# Patient Record
Sex: FEMALE | Race: WHITE | NOT HISPANIC OR LATINO | Employment: FULL TIME | URBAN - METROPOLITAN AREA
[De-identification: names, ages, dates, MRNs, and addresses within clinical notes are randomized per-mention and may not be internally consistent; named-entity substitution may affect disease eponyms.]

---

## 2017-08-13 ENCOUNTER — APPOINTMENT (OUTPATIENT)
Dept: ULTRASOUND IMAGING | Facility: HOSPITAL | Age: 27
End: 2017-08-13

## 2017-08-13 ENCOUNTER — HOSPITAL ENCOUNTER (EMERGENCY)
Facility: HOSPITAL | Age: 27
Discharge: HOME | End: 2017-08-13
Attending: EMERGENCY MEDICINE | Admitting: EMERGENCY MEDICINE

## 2017-08-13 VITALS
BODY MASS INDEX: 26.13 KG/M2 | TEMPERATURE: 99.4 F | HEART RATE: 73 BPM | OXYGEN SATURATION: 100 % | HEIGHT: 62 IN | RESPIRATION RATE: 16 BRPM | WEIGHT: 142 LBS | DIASTOLIC BLOOD PRESSURE: 76 MMHG | SYSTOLIC BLOOD PRESSURE: 130 MMHG

## 2017-08-13 DIAGNOSIS — O20.0 THREATENED MISCARRIAGE IN EARLY PREGNANCY: Primary | ICD-10-CM

## 2017-08-13 LAB
ABO GROUP BLD: NORMAL
ANION GAP SERPL CALCULATED.3IONS-SCNC: 14.4 MMOL/L
BASOPHILS # BLD AUTO: 0.02 10*3/MM3 (ref 0–0.2)
BASOPHILS NFR BLD AUTO: 0.2 % (ref 0–1.5)
BUN BLD-MCNC: 9 MG/DL (ref 6–20)
BUN/CREAT SERPL: 16.7 (ref 7–25)
CALCIUM SPEC-SCNC: 9.4 MG/DL (ref 8.6–10.5)
CHLORIDE SERPL-SCNC: 99 MMOL/L (ref 98–107)
CO2 SERPL-SCNC: 23.6 MMOL/L (ref 22–29)
CREAT BLD-MCNC: 0.54 MG/DL (ref 0.57–1)
DEPRECATED RDW RBC AUTO: 42.9 FL (ref 37–54)
EOSINOPHIL # BLD AUTO: 0.13 10*3/MM3 (ref 0–0.7)
EOSINOPHIL NFR BLD AUTO: 1.4 % (ref 0.3–6.2)
ERYTHROCYTE [DISTWIDTH] IN BLOOD BY AUTOMATED COUNT: 13.6 % (ref 11.7–13)
GFR SERPL CREATININE-BSD FRML MDRD: 134 ML/MIN/1.73
GLUCOSE BLD-MCNC: 85 MG/DL (ref 65–99)
HCG INTACT+B SERPL-ACNC: NORMAL MIU/ML
HCT VFR BLD AUTO: 35.5 % (ref 35.6–45.5)
HGB BLD-MCNC: 11.8 G/DL (ref 11.9–15.5)
HOLD SPECIMEN: NORMAL
HOLD SPECIMEN: NORMAL
IMM GRANULOCYTES # BLD: 0.02 10*3/MM3 (ref 0–0.03)
IMM GRANULOCYTES NFR BLD: 0.2 % (ref 0–0.5)
LYMPHOCYTES # BLD AUTO: 2.77 10*3/MM3 (ref 0.9–4.8)
LYMPHOCYTES NFR BLD AUTO: 28.8 % (ref 19.6–45.3)
MCH RBC QN AUTO: 28.7 PG (ref 26.9–32)
MCHC RBC AUTO-ENTMCNC: 33.2 G/DL (ref 32.4–36.3)
MCV RBC AUTO: 86.4 FL (ref 80.5–98.2)
MONOCYTES # BLD AUTO: 0.64 10*3/MM3 (ref 0.2–1.2)
MONOCYTES NFR BLD AUTO: 6.7 % (ref 5–12)
NEUTROPHILS # BLD AUTO: 6.03 10*3/MM3 (ref 1.9–8.1)
NEUTROPHILS NFR BLD AUTO: 62.7 % (ref 42.7–76)
PLATELET # BLD AUTO: 270 10*3/MM3 (ref 140–500)
PMV BLD AUTO: 10.2 FL (ref 6–12)
POTASSIUM BLD-SCNC: 3.8 MMOL/L (ref 3.5–5.2)
RBC # BLD AUTO: 4.11 10*6/MM3 (ref 3.9–5.2)
RH BLD: NEGATIVE
SODIUM BLD-SCNC: 137 MMOL/L (ref 136–145)
WBC NRBC COR # BLD: 9.61 10*3/MM3 (ref 4.5–10.7)
WHOLE BLOOD HOLD SPECIMEN: NORMAL
WHOLE BLOOD HOLD SPECIMEN: NORMAL

## 2017-08-13 PROCEDURE — 85025 COMPLETE CBC W/AUTO DIFF WBC: CPT

## 2017-08-13 PROCEDURE — 99284 EMERGENCY DEPT VISIT MOD MDM: CPT

## 2017-08-13 PROCEDURE — 76801 OB US < 14 WKS SINGLE FETUS: CPT

## 2017-08-13 PROCEDURE — 25010000002 RHO D IMMUNE GLOBULIN 1500 UNIT/2ML SOLUTION PREFILLED SYRINGE: Performed by: PHYSICIAN ASSISTANT

## 2017-08-13 PROCEDURE — 84702 CHORIONIC GONADOTROPIN TEST: CPT

## 2017-08-13 PROCEDURE — 76817 TRANSVAGINAL US OBSTETRIC: CPT

## 2017-08-13 PROCEDURE — 80048 BASIC METABOLIC PNL TOTAL CA: CPT

## 2017-08-13 PROCEDURE — 96372 THER/PROPH/DIAG INJ SC/IM: CPT

## 2017-08-13 PROCEDURE — 86900 BLOOD TYPING SEROLOGIC ABO: CPT

## 2017-08-13 PROCEDURE — 86901 BLOOD TYPING SEROLOGIC RH(D): CPT

## 2017-08-13 RX ORDER — PRENATAL VIT NO.126/IRON/FOLIC 28MG-0.8MG
TABLET ORAL DAILY
COMMUNITY
End: 2021-08-30

## 2017-08-13 RX ORDER — SODIUM CHLORIDE 0.9 % (FLUSH) 0.9 %
10 SYRINGE (ML) INJECTION AS NEEDED
Status: DISCONTINUED | OUTPATIENT
Start: 2017-08-13 | End: 2017-08-14 | Stop reason: HOSPADM

## 2017-08-13 RX ORDER — ASPIRIN 81 MG/1
81 TABLET ORAL DAILY
COMMUNITY
End: 2021-08-30

## 2017-08-13 RX ADMIN — HUMAN RHO(D) IMMUNE GLOBULIN 300 MCG: 1500 SOLUTION INTRAMUSCULAR; INTRAVENOUS at 22:15

## 2017-08-13 NOTE — ED NOTES
PT IS APPROX 6 WEEKS PREGNANT, STATES SHE STARTED HAVING BRIGHT RED VAGINAL BLEEDING THIS AM THAT STARTED OUT LIGHT AND IS NOW INCREASED. DENIES ANY ABDOMINAL PAIN AND STATES THAT SHE HAS HAD MULTIPLE MISCARRIAGES DUE TO A CLOTTING DISORDER THAT SHE TAKES A 81MG ASA DAILY FOR.      Oma Grant RN  08/13/17 8303

## 2017-08-13 NOTE — ED PROVIDER NOTES
"EMERGENCY DEPARTMENT ENCOUNTER    CHIEF COMPLAINT  Chief Complaint: vaginal bleeding   History given by: Pt   History limited by: N/A  Room Number:   PMD: No Known Provider  OB/GYN: Dr. Araujo     HPI:  Pt is a 27 y.o. female who presents complaining of vaginal bleeding that began earlier today. Pt states the bleeding began as spotting, but has worsened and is now bright red blood. Pt is 6 weeks pregnant   and pt has a hx of a miscarriages; G4, P1, Ab2. Pt's last known menstrual cycle was on 17. Pt reports having cramps 2 days ago, but denies any cramping currently. Pt also c/o nausea and vomiting yesterday. Pt is currently taking ASA daily for her clotting disorder and has chronic anemia.    Duration: earlier today   Onset: gradual   Timing: constant   Location: vaginal   Radiation: N/A  Quality: \"spotting\" that has progressed to \"bright red\"  Intensity/Severity: moderate  Progression: worsening   Associated Symptoms: nausea, vomiting, cramping that has resolved   Aggravating Factors: none reported   Alleviating Factors: none reported   Previous Episodes: Pt has a hx of miscarriages; G4, P1, Ab 2. Pt also has a chronic hx of anemia.  Treatment before arrival: Pt is currently taking ASA daily.     PAST MEDICAL HISTORY  Active Ambulatory Problems     Diagnosis Date Noted   • No Active Ambulatory Problems     Resolved Ambulatory Problems     Diagnosis Date Noted   • No Resolved Ambulatory Problems     Past Medical History:   Diagnosis Date   • Antiphospholipid antibody syndrome complicating pregnancy    • Miscarriage        PAST SURGICAL HISTORY  Past Surgical History:   Procedure Laterality Date   •  SECTION         FAMILY HISTORY  History reviewed. No pertinent family history.    SOCIAL HISTORY  Social History     Social History   • Marital status:      Spouse name: N/A   • Number of children: N/A   • Years of education: N/A     Occupational History   • Not on file.     Social History Main " Topics   • Smoking status: Never Smoker   • Smokeless tobacco: Not on file   • Alcohol use No   • Drug use: No   • Sexual activity: Not on file     Other Topics Concern   • Not on file     Social History Narrative   • No narrative on file       ALLERGIES  Review of patient's allergies indicates no known allergies.    REVIEW OF SYSTEMS  Review of Systems   Constitutional: Negative for fever.   HENT: Negative for sore throat.    Eyes: Negative.    Respiratory: Negative for cough and shortness of breath.    Cardiovascular: Negative for chest pain.   Gastrointestinal: Positive for nausea and vomiting. Negative for abdominal pain and diarrhea.   Genitourinary: Positive for vaginal bleeding and vaginal pain (cramping ). Negative for dysuria.   Musculoskeletal: Negative for neck pain.   Skin: Negative for rash.   Allergic/Immunologic: Negative.    Neurological: Negative for weakness, numbness and headaches.   Hematological: Negative.    Psychiatric/Behavioral: Negative.    All other systems reviewed and are negative.      PHYSICAL EXAM  ED Triage Vitals   Temp Heart Rate Resp BP SpO2   08/13/17 1731 08/13/17 1731 08/13/17 1731 08/13/17 1738 08/13/17 1731   99.4 °F (37.4 °C) 99 16 151/78 100 %      Temp src Heart Rate Source Patient Position BP Location FiO2 (%)   -- -- -- -- --              Physical Exam   Constitutional: She is oriented to person, place, and time and well-developed, well-nourished, and in no distress. No distress.   HENT:   Head: Normocephalic and atraumatic.   Eyes: EOM are normal. Pupils are equal, round, and reactive to light.   Neck: Normal range of motion. Neck supple.   Cardiovascular: Normal rate, regular rhythm and normal heart sounds.    Pulmonary/Chest: Effort normal and breath sounds normal. No respiratory distress.   Abdominal: Soft. There is no tenderness. There is no rebound and no guarding.   Musculoskeletal: Normal range of motion. She exhibits no edema.   Neurological: She is alert and  oriented to person, place, and time. She has normal sensation and normal strength.   Skin: Skin is warm and dry. No rash noted.   Psychiatric: Mood and affect normal.   Nursing note and vitals reviewed.      LAB RESULTS  Lab Results (last 24 hours)     Procedure Component Value Units Date/Time    CBC & Differential [[id number]] Collected:  08/13/17 1930    Specimen:  Blood Updated:  08/13/17 1948    Narrative:       The following orders were created for panel order CBC & Differential.  Procedure                               Abnormality         Status                     ---------                               -----------         ------                     CBC Auto Differential[[id number]]        Abnormal            Final result                 Please view results for these tests on the individual orders.    Basic Metabolic Panel [[id number]]  (Abnormal) Collected:  08/13/17 1930    Specimen:  Blood Updated:  08/13/17 2009     Glucose 85 mg/dL      BUN 9 mg/dL      Creatinine 0.54 (L) mg/dL      Sodium 137 mmol/L      Potassium 3.8 mmol/L      Chloride 99 mmol/L      CO2 23.6 mmol/L      Calcium 9.4 mg/dL      eGFR Non African Amer 134 mL/min/1.73      BUN/Creatinine Ratio 16.7     Anion Gap 14.4 mmol/L     Narrative:       GFR Normal >60  Chronic Kidney Disease <60  Kidney Failure <15    hCG, Quantitative, Pregnancy [[id number]] Collected:  08/13/17 1930    Specimen:  Blood Updated:  08/13/17 2021     HCG Quantitative 01839.00 mIU/mL     Narrative:       HCG Ranges by Gestational Age    3 Weeks         5.4 -      72 mIU/mL  4 Weeks        10.2 -     708 mIU/mL  5 Weeks       217   -   8,245 mIU/mL  6 Weeks       152   -  32,177 mIU/mL  7 Weeks     4,059   - 153,767 mIU/mL  8 Weeks    31,366   - 149,094 mIU/mL  9 Weeks    59,109   - 135,901 mIU/mL  10 Weeks   44,186   - 170,409 mIU/mL  12 Weeks   27,107   - 201,615 mIU/mL  14 Weeks   24,302   -  93,646 mIU/mL  15 Weeks   12,540   -  69,747 mIU/mL  16 Weeks    8,904   -   55,332 mIU/mL  17 Weeks    8,240   -  51,793 mIU/mL  18 Weeks    9,649   -  55,271 mIU/mL    CBC Auto Differential [[id number]]  (Abnormal) Collected:  08/13/17 1930    Specimen:  Blood Updated:  08/13/17 1948     WBC 9.61 10*3/mm3      RBC 4.11 10*6/mm3      Hemoglobin 11.8 (L) g/dL      Hematocrit 35.5 (L) %      MCV 86.4 fL      MCH 28.7 pg      MCHC 33.2 g/dL      RDW 13.6 (H) %      RDW-SD 42.9 fl      MPV 10.2 fL      Platelets 270 10*3/mm3      Neutrophil % 62.7 %      Lymphocyte % 28.8 %      Monocyte % 6.7 %      Eosinophil % 1.4 %      Basophil % 0.2 %      Immature Grans % 0.2 %      Neutrophils, Absolute 6.03 10*3/mm3      Lymphocytes, Absolute 2.77 10*3/mm3      Monocytes, Absolute 0.64 10*3/mm3      Eosinophils, Absolute 0.13 10*3/mm3      Basophils, Absolute 0.02 10*3/mm3      Immature Grans, Absolute 0.02 10*3/mm3           I ordered the above labs and reviewed the results    RADIOLOGY  US Ob < 14 Weeks Single or First Gestation   Final Result   1. Living, single IUP with estimated gestational age of 6 weeks, 4 days   by ultrasound.   2. Irregular hypoechoic area deep to the hypertrophied endometrium   possibly small area of subchorionic hemorrhage       This report was finalized on 8/13/2017 9:08 PM by Sukumar Root MD.          US Ob Transvaginal    (Results Pending)        I ordered the above noted radiological studies. Interpreted by radiologist. . Reviewed by me in PACS.       PROCEDURES  Procedures      PROGRESS AND CONSULTS  ED Course     1739: Ordered US Ob < 14 weeks and labs for further evaluation and analysis.    2119: Ordered Rhophylac. Placed consult to OB/GYN.     2124: Rechecked pt who is resting comfortably. Discussed US results and the potential for a miscarriage. Discussed f/u with pt's OB/GYN.     2132: Discussed pt's case with Dr. Coburn [OB/GYN] who recommends pt f/u with her primary OB/GYN- Dr. Araujo in the office in the next 1-2 weeks.     2135: Dr. Shaw rechecked pt and  agrees with disposition plan.       MEDICAL DECISION MAKING  Results were reviewed/discussed with the patient and they were also made aware of online access. Pt also made aware that some labs, such as cultures, will not be resulted during ER visit and follow up with PMD is necessary.     MDM  Number of Diagnoses or Management Options     Amount and/or Complexity of Data Reviewed  Clinical lab tests: reviewed and ordered (Rh Negative, labs unremarkable )  Tests in the radiology section of CPT®: ordered and reviewed (US OB Impression    1. Living, single IUP with estimated gestational age of 6 weeks, 4 days by ultrasound.  2. Irregular hypoechoic area deep to the hypertrophied endometrium possibly small area of subchorionic hemorrhage    )  Discussion of test results with the performing providers: yes (Dr. Coburn (OB/GYN))           DIAGNOSIS  Final diagnoses:   Threatened miscarriage in early pregnancy       DISPOSITION  DISCHARGE    Patient discharged in stable condition.    Reviewed implications of results, diagnosis, meds, responsibility to follow up, warning signs and symptoms of possible worsening, potential complications and reasons to return to ER.    Patient/Family voiced understanding of above instructions.    Discussed plan for discharge, as there is no emergent indication for admission.  Pt/family is agreeable and understands need for follow up and repeat testing.  Pt is aware that discharge does not mean that nothing is wrong but it indicates no emergency is present that requires admission and they must continue care with follow-up as given below or physician of their choice.     FOLLOW-UP  Haim Araujo MD  [address] [id number] [phone]    Schedule an appointment as soon as possible for a visit           Medication List      Notice     No changes were made to your prescriptions during this visit.            Latest Documented Vital Signs:  As of 10:21 PM  BP- 130/64  HR- 78 Temp- 99.4 °F (37.4 °C) O2 sat- 100%    --  Documentation assistance provided by brisa Maya for Renetta MARINELLI.  Information recorded by the allieibe was done at my direction and has been verified and validated by me.     Pernell Maya  08/13/17 1584       RAY Golden  08/13/17 6920

## 2017-08-14 NOTE — ED PROVIDER NOTES
The patient presents complaining of vaginal bleeding with bright red blood since earlier this morning. The pt states she is 6 weeks pregnant.    Discussed the US results with the pt and the cause of her vaginal bleeding. Discussed the plan to discharge the pt with the plan to return to the ED upon onset of passing clots or abd pain. The pt understands and agrees with the plan.    Limited physical exam:  Patient is nontoxic appearing and in NAD. The pt is alert and oriented X 3.  Lungs/cardiovascular: The pt's heart is RRR without murmur. The pt's lungs are clear to auscultation.  Abdomen: The pt's abd is soft and nontender.      I supervised care provided by the midlevel provider.  We have discussed this patient's history, physical exam, and treatment plan.  I have reviewed the note and personally saw and examined the patient and agree with the plan of care.    Documentation assistance provided by brisa Cuenca for Dr. Shaw. Information recorded by the scribe was done at my direction and has been verified and validated by me.    RAY Cuenca  08/13/17 9955       Christian Shaw MD  08/13/17 6663

## 2017-08-14 NOTE — DISCHARGE INSTRUCTIONS
Home, rest, pelvic rest, home medicine as prescribed, call to schedule follow up with your OB for recheck.  Return to care with further concerns.

## 2023-12-14 ENCOUNTER — TRANSCRIBE ORDERS (OUTPATIENT)
Dept: ADMINISTRATIVE | Facility: HOSPITAL | Age: 33
End: 2023-12-14
Payer: OTHER GOVERNMENT

## 2023-12-14 DIAGNOSIS — N94.6 DYSMENORRHEA: Primary | ICD-10-CM

## 2024-01-09 ENCOUNTER — HOSPITAL ENCOUNTER (OUTPATIENT)
Dept: ULTRASOUND IMAGING | Facility: HOSPITAL | Age: 34
Discharge: HOME | End: 2024-01-09
Admitting: NURSE PRACTITIONER
Payer: OTHER GOVERNMENT

## 2024-01-09 DIAGNOSIS — N94.6 DYSMENORRHEA: ICD-10-CM

## 2024-01-09 PROCEDURE — 76856 US EXAM PELVIC COMPLETE: CPT

## 2024-01-09 PROCEDURE — 76830 TRANSVAGINAL US NON-OB: CPT
